# Patient Record
Sex: MALE | Race: WHITE | URBAN - METROPOLITAN AREA
[De-identification: names, ages, dates, MRNs, and addresses within clinical notes are randomized per-mention and may not be internally consistent; named-entity substitution may affect disease eponyms.]

---

## 2022-04-15 ENCOUNTER — APPOINTMENT (OUTPATIENT)
Dept: URBAN - METROPOLITAN AREA CLINIC 291 | Age: 22
Setting detail: DERMATOLOGY
End: 2022-04-18

## 2022-04-15 DIAGNOSIS — L70.0 ACNE VULGARIS: ICD-10-CM

## 2022-04-15 DIAGNOSIS — L50.8 OTHER URTICARIA: ICD-10-CM

## 2022-04-15 PROCEDURE — OTHER PRESCRIPTION: OTHER

## 2022-04-15 PROCEDURE — OTHER TREATMENT REGIMEN: OTHER

## 2022-04-15 PROCEDURE — OTHER COUNSELING: OTHER

## 2022-04-15 PROCEDURE — 99203 OFFICE O/P NEW LOW 30 MIN: CPT

## 2022-04-15 RX ORDER — DOXYCYCLINE HYCLATE 100 MG/1
CAPSULE, GELATIN COATED ORAL
Qty: 30 | Refills: 3 | Status: ERX | COMMUNITY
Start: 2022-04-15

## 2022-04-15 RX ORDER — DIPHENHYDRAMINE HCL 2 %
CREAM (GRAM) TOPICAL
Qty: 60 | Refills: 3 | Status: ERX | COMMUNITY
Start: 2022-04-15

## 2022-04-15 RX ORDER — SALICYLIC ACID 3 G/100G
LOTION TOPICAL
Qty: 30 | Refills: 3 | Status: ERX | COMMUNITY
Start: 2022-04-15

## 2022-04-15 ASSESSMENT — LOCATION SIMPLE DESCRIPTION DERM
LOCATION SIMPLE: RIGHT UPPER ARM
LOCATION SIMPLE: RIGHT LOWER BACK
LOCATION SIMPLE: RIGHT CLAVICULAR SKIN
LOCATION SIMPLE: LEFT UPPER ARM
LOCATION SIMPLE: RIGHT FOREARM
LOCATION SIMPLE: RIGHT SHOULDER
LOCATION SIMPLE: LEFT UPPER BACK
LOCATION SIMPLE: LEFT FOREARM
LOCATION SIMPLE: RIGHT UPPER BACK

## 2022-04-15 ASSESSMENT — LOCATION DETAILED DESCRIPTION DERM
LOCATION DETAILED: RIGHT ANTERIOR SHOULDER
LOCATION DETAILED: RIGHT CLAVICULAR SKIN
LOCATION DETAILED: LEFT DISTAL DORSAL FOREARM
LOCATION DETAILED: RIGHT SUPERIOR MEDIAL UPPER BACK
LOCATION DETAILED: RIGHT SUPERIOR LATERAL UPPER BACK
LOCATION DETAILED: LEFT VENTRAL PROXIMAL FOREARM
LOCATION DETAILED: RIGHT VENTRAL PROXIMAL FOREARM
LOCATION DETAILED: LEFT DISTAL POSTERIOR UPPER ARM
LOCATION DETAILED: LEFT MID-UPPER BACK
LOCATION DETAILED: LEFT ANTERIOR PROXIMAL UPPER ARM
LOCATION DETAILED: RIGHT DISTAL DORSAL FOREARM
LOCATION DETAILED: RIGHT DISTAL POSTERIOR UPPER ARM
LOCATION DETAILED: RIGHT SUPERIOR MEDIAL MIDBACK

## 2022-04-15 ASSESSMENT — LOCATION ZONE DERM
LOCATION ZONE: TRUNK
LOCATION ZONE: ARM

## 2022-04-15 NOTE — PROCEDURE: TREATMENT REGIMEN
Initiate Treatment: Doxycycline 100mg 1 tablet everyday.
Otc Regimen: CeraVe Acne Foaming Cleanser wash affected areas 1-2 times per day.
Initiate Treatment: Allegra 180mg 1 tab every morning.  Benadryl 50mg 1 tab at bedtime.
Samples Given: CeraVe Acne Foaming Cleanser.  Doryx 200mg (2 bottles with #3 pills in each) patient to take 200mg everyday for 6 days then start the 100mg Doxycycline.
Detail Level: Zone
Plan: Discussed treatment with Xolair and gave info for Dr. Núñez so patient’s PCP can generate referral.\\navoid big temperature swings

## 2022-04-15 NOTE — HPI: HIVES (URTICARIA)
How Severe Are Your Hives?: moderate
Is This A New Presentation, Or A Follow-Up?: Hives
Additional History: Saw a Neurologist

## 2022-10-20 ENCOUNTER — APPOINTMENT (OUTPATIENT)
Dept: URBAN - METROPOLITAN AREA CLINIC 291 | Age: 22
Setting detail: DERMATOLOGY
End: 2022-10-23

## 2022-10-20 DIAGNOSIS — Z71.89 OTHER SPECIFIED COUNSELING: ICD-10-CM

## 2022-10-20 DIAGNOSIS — L50.8 OTHER URTICARIA: ICD-10-CM

## 2022-10-20 PROCEDURE — OTHER SUNSCREEN RECOMMENDATIONS: OTHER

## 2022-10-20 PROCEDURE — OTHER TREATMENT REGIMEN: OTHER

## 2022-10-20 PROCEDURE — 99213 OFFICE O/P EST LOW 20 MIN: CPT

## 2022-10-20 PROCEDURE — OTHER COUNSELING: OTHER

## 2022-10-20 PROCEDURE — OTHER MIPS QUALITY: OTHER

## 2022-10-20 ASSESSMENT — LOCATION SIMPLE DESCRIPTION DERM
LOCATION SIMPLE: UPPER BACK
LOCATION SIMPLE: LEFT FOREARM
LOCATION SIMPLE: RIGHT FOREARM

## 2022-10-20 ASSESSMENT — LOCATION ZONE DERM
LOCATION ZONE: TRUNK
LOCATION ZONE: ARM

## 2022-10-20 ASSESSMENT — LOCATION DETAILED DESCRIPTION DERM
LOCATION DETAILED: LEFT VENTRAL PROXIMAL FOREARM
LOCATION DETAILED: RIGHT VENTRAL PROXIMAL FOREARM
LOCATION DETAILED: SUPERIOR THORACIC SPINE

## 2022-10-20 NOTE — PROCEDURE: TREATMENT REGIMEN
Plan: Discussed at todays visit to have patient see  Dr. Dominic Dotson at [facility] [place] for the Urticaria and evaluation. Gave patient information for Dr.Gilbert Dotson.\\n\\nif any rash occurs we can do a biopsy but most of the rash is a sensation.  \\n\\n Plan: Discussed at todays visit to have patient see  Dr. Dominic Dotson at [facility] [place] for the Urticaria and evaluation. Gave patient information for Dr.Gilbert Dotson.\\n\\nif any rash occurs we can do a biopsy but most of the rash is a sensation.  \\n\\n